# Patient Record
(demographics unavailable — no encounter records)

---

## 2025-01-27 NOTE — DISCUSSION/SUMMARY
[Medication Risks Reviewed] : Medication risks reviewed [Surgical risks reviewed] : Surgical risks reviewed [de-identified] : This is a 68 year old F pt presents today with bone-on-bone lateral compartmental osteoarthritis of the left knee. Previous left knee MRI shows complex degenerative appearing macerated medial and lateral meniscal tears and low-grade partial-thickness tear of the ACL with moderate mucoid degeneration. Patient is status post right TKA done in 2022. The nature of condition and treatment options were discussed in detail. Patient is a candidate for left TKA. The surgery was discussed in detail including pre-op and post-op. The pt is having worse pain with walking, stairs, exercise, getting up from seated position. The pain is limiting her quality of life. The pt has exhausted over 3 months of conservative treatment including cortisone injections, home therapy, PT, Tylenol, and activity modification. I believe left TKA is reasonable. Pt understands that she will require PCP and cardiac clearance prior to surgery. The pt will talk with the surgical coordinator to schedule a left TKA. All questions and concerns were answered.  The patient is a 68 year old individual with end stage arthritis of their left knee joint. The patient has exhausted a minimum of 3 months conservative treatment including prior injections (cortisone and/or hyaluronic acid injections), physical therapy, over the counter NSAIDS and pain medication where indicated. In addition, the patient's quality of life is diminished due to significant chronic pain. The patient is having difficulty with activities of daily living, including ambulating, descending stairs, and rising from a seated position. Based upon the patients continued symptoms and failure to respond to conservative treatment, I have recommended a left total knee replacement for this patient. A long discussion took place with the patient describing what a total joint replacement is and what the procedure would entail. A knee model, similar to the implant that will be used during the operation, was utilized to demonstrate and to discuss the various bearing surfaces of the implants. Implant fixation, use of cement, was also discussed with the patient. Choices of implant manufacturers, mainly [employer] and [employer], were discussed and reviewed with preference to be made by patient and surgeon prior to operation. Final selection to be based on customary practice as well as preoperative templating with selection confirmed intraoperatively based on the patient's anatomy. The patient participated and agreed with the decision-making process. The hospitalization and post-operative care and rehabilitation were also discussed. The use of perioperative antibiotics and DVT prophylaxis were discussed. The risk, benefits and alternatives to a surgical intervention were discussed at length with the patient. The patient was also advised of risks related to the medical comorbidities and elevated body mass index (BMI). A lengthy discussion took place to review the most common complications including but not limited to: deep vein thrombosis, pulmonary embolism, heart attack, stroke, infection, wound breakdown, numbness, damage to nerves, tendon, muscles, arteries or other blood vessels, death and other possible complications from anesthesia. The patient was told that we will take steps to minimize these risks by using sterile technique, antibiotics and DVT prophylaxis when appropriate and follow the patient postoperatively in the office setting to monitor progress. The possibility of recurrent pain, no improvement in pain and actual worsening of pain were also discussed with the patient.   The discharge plan of care focused on the patient going home following surgery. The patient was encouraged to make the necessary arrangements to have someone stay with them when they are discharged home. Following discharge, a home care nurse will visit the patient. The home care nurse will open your home care case and request home physical therapy services. Home physical therapy will commence following discharge provided it is appropriate and covered by the health insurance benefit plan.   The benefits of surgery were discussed with the patient including the potential for improving his/her current clinical condition through operative intervention. Alternatives to surgical intervention including continued conservative management were also discussed in detail. All questions were answered to the satisfaction of the patient. The treatment plan of care, as well as a model of a total knee equivalent to the one that will be used for their total joint replacement, was shared with the patient. The patient participated and agreed to the plan of care as well as the use of the recommended implants for their total joint replacement surgery.

## 2025-01-27 NOTE — PHYSICAL EXAM
[de-identified] : GENERAL APPEARANCE: Well nourished and hydrated, pleasant, alert, and oriented x 3. Appears their stated age. HEENT: Normocephalic, extraocular eye motion intact. Nasal septum midline. Oral cavity clear. External auditory canal clear. RESPIRATORY: Breath sounds clear and audible in all lobes. No wheezing, No accessory muscle use. CARDIOVASCULAR: No apparent abnormalities. No lower leg edema. No varicosities. Pedal pulses are palpable. NEUROLOGIC: Sensation is normal, no muscle weakness in the upper or lower extremities. DERMATOLOGIC: No apparent skin lesions, moist, warm, no rash. SPINE: Cervical spine appears normal and moves freely; thoracic spine appears normal and moves freely; lumbosacral spine appears normal and moves freely, normal, nontender. MUSCULOSKELETAL: Hands, wrists, and elbows are normal and move freely, shoulders are normal and move freely. PSYCHIATRIC: Oriented to person, place, and time, insight and judgement were intact and the affect was normal. 5/5 motor strength in bilateral lower extremities. Sensory: Intact in bilateral lower extremities. DTRs: Biceps, brachioradialis, triceps, patellar, ankle and plantar 2+ and symmetric bilaterally. Pulses: dorsalis pedis, posterior tibial, femoral, popliteal, and radial 2+ and symmetric bilaterally. Constitutional: Alert and in no acute distress, but well-appearing. [de-identified] : Right knee exam shows healed incision with no sign of infection. ROM 0-130 Left knee exam shows lateral jointline tenderness, ROM 0-125 [de-identified] : 4V xray of the left knee done in the office today and reviewed by Dr. Deniz Hargrove demonstrates bone-on-bone lateral compartmental osteoarthritis.  MRI of the left knee performed at  on 8/7/2024 reveals the following: Mild DJD, severe and worst in the lateral compartment. Large knee joint effusion with extensive synovitis Complex degenerative appearing macerated medial and lateral meniscal tears most notable for large radial component involving the posterior root attachment of medial meniscus with moderate peripheral extrusion of meniscal tissue Low-grade partial-thickness tear of the ACL with moderate mucoid degeneration. Mild mucoid degeneration of ACL.

## 2025-01-27 NOTE — END OF VISIT
[FreeTextEntry3] : Documented by Shen Gorman acting solely as a scribe for Dr. Deniz Hargrove on this date 01/27/2025.   All Medical record entries made by the Scribe were at my, Dr. Deniz Hargrove's, direction and personally dictated by me on 01/27/2025. I have reviewed the chart and agree that the record accurately reflects my personal performance of the history, physical exam, assessment and plan. I have also personally directed, reviewed, and agreed with the discharge instructions.

## 2025-01-27 NOTE — HISTORY OF PRESENT ILLNESS
[de-identified] : This is a 68 year old F pt presents today for initial evaluation of left knee pain. She has been having left knee pain for years. The pain is intermittent, dull aching mainly on the lateral aspect of the knee. She has worsening pain with prolonged walking, stairs, and exercises. She has tried physical therapy in the past without significant relief. She recalls that she had a slip and fall in 2022 while playing with grandchildren. She has been on Plavix for 20 days for risk of TIA. She is seen by cardiologist. She has a left knee MRI done. She is here for surgical counseling. She is status post right TKA done by Dr. Garcia in 2022. She is doing well on the right knee.

## 2025-02-21 NOTE — HISTORY OF PRESENT ILLNESS
[FreeTextEntry1] :  [facility] NEUROLOGY AT [place]  CC: Possible TIA HPI: 69 y/o F with hx of HLD, HTN since 40s, hx of CAD, arthritis, hx of migraines,  Meniere's syndrome, who presents for evaluation of recent episode of Left arm numbness.   On January 15, she had sudden onset of LUE tingling as well weakness.  Also had associated nausea, eyes felt flickering. Possibly a headache but could not decipher as she always has neck pain.  No numbness/weakness in left leg or face. In general just did not feel well--complained of fatigue.  Total duration lasted approximately 1-2 days of intermittent left arm tingling and fatigue for one day.  BP was elevated when she arrived to [facility].  States she was noting this periodically, especially if eating high salt diet.    Was discharged from [facility] and heard conflicting diagnosis, so is unclear if she had a stroke or TIA.  Was put on Plavix x 21 days in addition to her home Aspirin.  She has followed up with cardiology and has reduced her salt intake so BP markedly better.  She has had no recurrence of her symptoms.  States she has NO prior hx of paresthesias related to her neck.    Mother--cerebral aneurysm, ruptured. Maternal uncle aneurysm, ruptured at age 40.  Brother in 60s with stroke, Brother in 80s with stroke, Brother in mid-80s with stroke.    Reviewed OSH records: A1c 5.1 LDL 57 CTA head and neck neg CTP showing small focal perfusion deficit of 6ml in R occipital lobe which could be artifactual.  MRI Brain 1/15/25: Neg for acute intracranial abnormality. +Chronic small vessel white matter ischemic changes.  TTE: EF 55-60%, no PFO

## 2025-02-21 NOTE — PHYSICAL EXAM

## 2025-02-21 NOTE — PHYSICAL EXAM

## 2025-02-21 NOTE — ASSESSMENT
[FreeTextEntry1] : Patient had a coronary CTA performed on March 28, 2023: Right dominant coronary system.  Proximal LAD calcific plaque with 50% nonobstructive stenosis.  Total coronary calcium score is 315.  Echocardiogram January 14, 2025: This echo was done at [facility].  LVEF 55 to 60%.  Normal RV size and function.  No evidence of PFO.  Mild aortic regurgitation.  Mild tricuspid regurgitation.  No pericardial effusion.  EKG 2/13/2025- Sinus Rhythm  Low voltage in precordial leads. -Poor R-wave progression -may be secondary to pulmonary disease  consider old anterior infarct.  Assessment: 1.  Chest pain with an abnormal EKG and known CAD based on the CTA of the coronary arteries. 2.  Hypertension 3.  Hyperlipidemia 4.  Strong family history for premature CAD 5.  Right leg swelling after the fall 2 weeks ago 6.  Left knee problem-patient scheduled to have left knee surgery sometime in April 2025.   Recommendations: 1.  Right leg venous duplex. 2.  Follow-up in 2 -4 weeks 3. Pharm nuclear stress test For preop cardiac risk assessment prior to knee surgery. 4. Patient to continue aspirin, statin and beta-blocker therapy.

## 2025-02-21 NOTE — ASSESSMENT
[FreeTextEntry1] : Patient had a coronary CTA performed on March 28, 2023: Right dominant coronary system.  Proximal LAD calcific plaque with 50% nonobstructive stenosis.  Total coronary calcium score is 315.  Echocardiogram January 14, 2025: This echo was done at [facility].  LVEF 55 to 60%.  Normal RV size and function.  No evidence of PFO.  Mild aortic regurgitation.  Mild tricuspid regurgitation.  No pericardial effusion.  EKG 2/13/2025- Sinus Rhythm  Low voltage in precordial leads. -Poor R-wave progression -may be secondary to pulmonary disease  consider old anterior infarct.  Assessment: 1.  Chest pain with an abnormal EKG and known CAD based on the CTA of the coronary arteries. 2.  Hypertension 3.  Hyperlipidemia 4.  Strong family history for premature CAD 5.  Right leg swelling after the fall 2 weeks ago 6.  Left knee problem-patient scheduled to have left knee surgery sometime in April 2025.   Recommendations: 1.  Right leg venous duplex. 2.  Follow-up in 2 -4 weeks 3. Pharm nuclear stress test For preop cardiac risk assessment prior to knee surgery. 4. Patient to continue aspirin, statin and beta-blocker therapy.

## 2025-02-21 NOTE — ASSESSMENT
[FreeTextEntry1] : 67 y/o F with hx of HLD, HTN since 40s, hx of CAD, arthritis, hx of migraines,  Meniere's syndrome, who presents for evaluation of recent episode of Left arm numbness and fatigue, nausea. She self-reports she had very elevated BP upon admission to [facility].  I reviewed the MRI Brain personally and do not see any evidence of acute infarction. She has mild microvascular changes in the subcortical white matter, which are minimal and may be related to HTN or migraines.    I have discussed with her that at this time her clinical hx is suggestive of HTN urgency rather than an actual TIA.  Given the duration of her symptoms, MRI should have been positive if indeed cerebral ischemia.  We discussed vascular risk factor modification and in her case it will be to monitor her salt intake.  PLAN: - Cont. ASA 81mg daily - Cont. Statin - Yearly carotid duplex - No neurological contraindication to undergo upcoming knee surgery  We spoke about the importance of lifestyle factors including refraining from tobacco use, diet (Mediterranean diet) that emphasizes vegetables, fruits, and whole grains and includes low-fat dairy products, poultry, fish, legumes, olive oil, and nuts while limiting intake of sweets and red meats, moderate- to vigorous-intensity aerobic physical exercise lasting at least 40 minutes 3-4 times per week, adequate glucose control, medication compliance, and consistent outpatient follow-up for monitoring of blood pressure, glucose levels and lipids with a goal blood pressure under 140/90, hemoglobin A1c < 7.0 and goal LDL under 70 that will help in reducing future stroke risk.  We discussed the importance of adequate hydration and making sure to drink eight eight-ounce glasses of water daily.   We also discussed general symptoms that should prompt immediate presentation to the emergency department for evaluation of acute stroke including: sudden onset of focal weakness, numbness, difficulty with speech production or comprehension, slurred speech, visual changes, gait imbalance, and/or sudden/severe headache.

## 2025-02-21 NOTE — PHYSICAL EXAM
[FreeTextEntry1] :   General: Cooperative, NAD HEENT: NC/AT, no carotid bruits Lungs: CTAB Chest: RRR, no murmurs Extremities: nontender, no erythema Neurological Examination: NIHSS: 0 MS: AOx3. Appropriately interactive, normal affect. Speech fluent w/o paraphasic errors CN: PERLL, EOMI, V1-3 sensation intact, face symmetric, hearing intact, palate elevates symmetrically, tongue midline, SCM equal bilaterally Motor: normal bulk and tone, no tremor, rigidity or bradykinesia.  5/5 all over Sens: Intact to light touch. Reflexes: 2/4 all over, downgoing toes b/l Coord:  No dysmetria, DUKE intact Gait: Normal

## 2025-02-21 NOTE — HISTORY OF PRESENT ILLNESS
[FreeTextEntry1] : HPI: Patient is a 68-year-old  female with a past medical history of hypertension, hyperlipidemia, coronary artery disease based on the coronary artery calcification, approximately about 2 years ago she was told that she has a 75% blockage in one of the coronary arteries details are not available at this time.  Patient has a left knee problem scheduled to undergo left knee surgery in April 2025.  Because of the left knee problem she fell 2 weeks ago and since then she has noted some swelling in the right leg which she thinks is a blood clot.  Patient is having substernal chest pain with exertion lasting for few seconds, today she had some nausea and some sweating.  Patient denies orthopnea PND or leg edema. Patient was at [facility] she brought hospital records including CT report of the brain, echocardiogram.  She brought a CD of all the imaging. Coronary artery disease based on the coronary CTA performed in March 2023. Nonobstructive LAD disease.    PMH: Hypertension, hyperlipidemia, coronary artery disease based on the coronary artery calcification. Patient was told that she has a 75% blockage about 2 years ago on coronary CTA No diabetes.  No history of any TIA or CVA.  No history of myocardial infarction.  No history of CHF.  Social History: Non-smoker denies any alcohol or substance abuse. She has a doctorate in speech,Patient works as a speech and language therapist.  Family history: Patient's brothers had a premature coronary artery disease one of them recently passed away.

## 2025-02-21 NOTE — HISTORY OF PRESENT ILLNESS
[FreeTextEntry1] : HPI: Patient is a 68-year-old  female with a past medical history of hypertension, hyperlipidemia, coronary artery disease based on the coronary artery calcification, approximately about 2 years ago she was told that she has a 75% blockage in one of the coronary arteries details are not available at this time.  Patient has a left knee problem scheduled to undergo left knee surgery in April 2025.  Because of the left knee problem she fell 2 weeks ago and since then she has noted some swelling in the right leg which she thinks is a blood clot.  Patient is having substernal chest pain with exertion lasting for few seconds, today she had some nausea and some sweating.  Patient denies orthopnea PND or leg edema. Patient was at [facility] she brought hospital records including CT report of the brain, echocardiogram.  She brought a CD of all the imaging. Coronary artery disease based on the coronary CTA performed in March 2023. Nonobstructive LAD disease.    PMH: Hypertension, hyperlipidemia, coronary artery disease based on the coronary artery calcification. Patient was told that she has a 75% blockage about 2 years ago on coronary CTA No diabetes.  No history of any TIA or CVA.  No history of myocardial infarction.  No history of CHF.  Social History: Non-smoker denies any alcohol or substance abuse. She has a doctorate in speech,Patient works as a speech and language therapist.  Family history: Patient's brothers had a premature coronary artery disease one of them recently passed away.

## 2025-03-20 NOTE — HISTORY OF PRESENT ILLNESS
[de-identified] : s/p left TKR 3/13/25 [de-identified] : Patient is 1 week postop left total knee replacement.  She did have a reaction to the Mepilex dressing which was removed and she reports rash resolved.  Overall she is doing well otherwise she does report clicking in the knee pain is well-controlled.  Ambulating with a walker [de-identified] : Left knee exam well-healed incision no sign of infection.  No rash.  Steri-Strips in place.  No drainage or bleeding range of motion 5/105 [de-identified] : Three-view imaging of the left knee shows well-fixed implants no subsidence loosening or wear [de-identified] : Postoperatively, the patient is doing well, has excellent pain control and is showing no signs of infection. [de-identified] : Patient will continue with the physical therapy and low impact exercise. I discussed use of antibiotic before dental work for 2 years. follow-up in 1 month.t

## 2025-05-14 NOTE — HISTORY OF PRESENT ILLNESS
[0] : no pain reported [Doing Well] : is doing well [No Sign of Infection] : is showing no signs of infection [Xray (Date:___)] : [unfilled] Xray -  [Excellent Pain Control] : has excellent pain control [Chills] : no chills [Constipation] : no constipation [Diarrhea] : no diarrhea [Dysuria] : no dysuria [Fever] : no fever [Nausea] : no nausea [Vomiting] : no vomiting [de-identified] : s/p left TKR 3/13/25. [de-identified] : Patient is 10 weeks from left total knee arthroplasty Patient is able to walk outdoor for an exercise. She is happy with her pain relief. However, she still has a little rash in the lateral portion of the mid incision.  Patient had some blisters related to adhesive allergy and sensitivity to Dermabond. She reports back pain from Sciatica.  [de-identified] :  left knee  exam shows healing incision with no sign of infection, ROM 0-130 degree. The surgical incision site(s) swollen, but clean, dry and intact, healed, not erythematous and not dehisced. Additional findings included an unremarkable neurological exam, peripheral vascular exam normal and maneuvers demonstrated a negative Jaleesa's sign.   [de-identified] :  3V xray of the left knee done in the office today and reviewed and demonstrates s/p tka implants in good positioning with no evidence of wear, loosening, or subsidence.   [de-identified] : Patient will continue with posterior hip precaution low impact exercise.  She will finish up her physical therapy sessions she is cleared to undergoing any other medical procedures and okay to have MRI. Patient will require antibiotic prior to dental work next 2 years. She may set off a metal detector due to metal implants.  I provided a prescription of steroid ointment for rash return to office in a year for x-ray surveillance.

## 2025-05-14 NOTE — END OF VISIT
[FreeTextEntry3] : Documented by Shen Gorman acting solely as a scribe for Dr. Deniz Hargrove on this date 05/14/2025.   All Medical record entries made by the Scribe were at my, Dr. Deniz Hargrove's, direction and personally dictated by me on 05/14/2025. I have reviewed the chart and agree that the record accurately reflects my personal performance of the history, physical exam, assessment and plan. I have also personally directed, reviewed, and agreed with the discharge instructions.

## 2025-06-04 NOTE — HISTORY OF PRESENT ILLNESS
[de-identified] : s/p left TKR 3/13/25. [de-identified] : Patient is 70's postop left total knee replacement.  She reports overall she is doing well.  She has made good progress with physical therapy.  She is complaining of some varicose veins of the lower extremity that have been bothering her but otherwise does not have pain in the knee [de-identified] : Left knee exam well-healed incision no sign of infection range of motion 0/140 [de-identified] : Three-view imaging of the left knee shows well-fixed implants no subsidence loosening or wear [de-identified] : Postoperatively, the patient is doing well, has excellent pain control and is showing no signs of infection. [de-identified] : Patient will continue with the physical therapy and low impact exercise. I discussed use of antibiotic before dental work for 2 years. follow-up one year for xr surveillance  She has some varicose vein she requested of vascular referral.  I am going to refer her to Dr. Deniz Grier